# Patient Record
Sex: MALE | Race: WHITE
[De-identification: names, ages, dates, MRNs, and addresses within clinical notes are randomized per-mention and may not be internally consistent; named-entity substitution may affect disease eponyms.]

---

## 2018-02-26 ENCOUNTER — HOSPITAL ENCOUNTER (EMERGENCY)
Dept: HOSPITAL 80 - FED | Age: 38
Discharge: HOME | End: 2018-02-26
Payer: MEDICAID

## 2018-02-26 VITALS
DIASTOLIC BLOOD PRESSURE: 84 MMHG | TEMPERATURE: 98.6 F | OXYGEN SATURATION: 97 % | HEART RATE: 81 BPM | SYSTOLIC BLOOD PRESSURE: 122 MMHG

## 2018-02-26 VITALS — RESPIRATION RATE: 16 BRPM

## 2018-02-26 DIAGNOSIS — F17.200: ICD-10-CM

## 2018-02-26 DIAGNOSIS — J40: Primary | ICD-10-CM

## 2018-02-26 LAB — PLATELET # BLD: 107 10^3/UL (ref 150–400)

## 2018-02-26 NOTE — EDPHY
H & P


Stated Complaint: Fever, sob, confusion, dizziness, chest pain for 5 days.





- Personal History


Current Tetanus Diphtheria and Acellular Pertussis (TDAP): Yes





- Medical/Surgical History


Hx Asthma: No


Hx Chronic Respiratory Disease: No


Hx Diabetes: No


Hx Cardiac Disease: No


Hx Renal Disease: No


Hx Cirrhosis: No


Hx Alcoholism: No


Hx HIV/AIDS: No


Hx Splenectomy or Spleen Trauma: No


Other PMH: ANXIETY FACIAL RECONSTRUCTION R BBB





- Social History


Smoking Status: Current every day smoker


Time Seen by Provider: 02/26/18 13:50


HPI/ROS: 





CHIEF COMPLAINT: "I think I have the flu"





HISTORY OF PRESENT ILLNESS:  38-year-old immunocompetent male with no influenza 

vaccination this season complaining of 6 days of flu-like symptoms including 

high dyspnea, cough, chest pain.  No fluids vaccination.  No abdominal pain.  

No nausea or vomiting.  No diarrhea





PRIMARY CARE PROVIDER:  Benito cooper





REVIEW OF SYSTEMS:


A ten point review of systems was performed and is negative with the exception 

of the items mentioned in the HPI








PAST MEDICAL & SURGICAL  HISTORY:  Anxiety





SOCIAL HISTORY:Half pack per day tobacco.     








************


PHYSICAL EXAM





(Prior to examination, patient consented to physical exam, hands were washed 

and my usual and customary physical exam procedures followed)


1) GENERAL: Well-developed, well-nourished, alert and oriented.  Appears 

nontoxic.


2) HEAD: Normocephalic, atraumatic


3) HEENT: Pupils equal, round, reactive to light bilaterally.  Sclera 

anicteric.  Nasopharynx, oropharynx, clear, no lesions.  No tonsillar 

enlargement or exudate Ears bilaterally with normal tympanic membranes.


4) NECK: Full range of motion, no meningeal signs.


5) LUNGS: Clear auscultation bilaterally, no wheezes, no rhonchi, no 

retractions.   


6) HEART: Regular rate and rhythm, no murmur, no heave, no gallop.


7) ABDOMEN: No guarding, no rebound, no focal tenderness, negative McBurney's, 

negative Angulo's, negative Rovsing's, negative peritoneal sign,


8) MUSCULOSKELETAL: Moving all extremities, no focal areas of tenderness, no 

obvious trauma.  No peripheral edema or discoloration.


9) BACK: No CVA tenderness, no midline vertebral tenderness, no fluctuance, no 

step-off, no obvious trauma, no visual or palpable abnormality. 


10) SKIN: No rash, no petechiae. 


11) Psychiatric:  Patient is oriented X 3, there is no agitation.








***************





DIFFERENTIAL DIAGNOSIS:  In no particular order, including but not limited to 

myocardial ischemia, pulmonary embolus, chest wall pain, pleural inflammation 

and pulmonary infectious causes.


 (GLORIA Martell)


Constitutional: 


 Initial Vital Signs











Temperature (C)  37 C   02/26/18 12:55


 


Heart Rate  88   02/26/18 12:55


 


Respiratory Rate  16   02/26/18 12:55


 


Blood Pressure  115/69   02/26/18 12:55


 


O2 Sat (%)  96   02/26/18 12:55








 











O2 Delivery Mode               Room Air














Allergies/Adverse Reactions: 


 





No Known Allergies Allergy (Unverified 02/27/14 17:34)


 








Home Medications: 














 Medication  Instructions  Recorded


 


Albuterol [Proventil Inhaler HFA 1 - 2 puffs IH Q4PRN PRN #1 mdi 02/26/18





(*)]  


 


Benzonatate [Tessalon Pearles (RX)] 200 mg PO TID PRN #15 cap 02/26/18


 


Clonazepam  02/26/18


 


Ipratropium 0.06% Nasal [Atrovent 2 sprays EACHNARE QID #1 mdi 02/26/18





0.06% Nasal (RX)]  


 


Wellbutrin 100mg (*)  02/26/18














Medical Decision Making


ED Course/Re-evaluation: 





I did not see this patient while he was in the emergency department.  However 

his care was discussed with the PA while the patient was in the department.  I 

agree with treatment plan and management (Heraclio Palomo)





4:13 p.m.:  Re-evaluation.  He is feeling improvement.  He appears well.  

Discussed his diagnostic studies which include normal sinus rhythm EKG, 

negative troponin, negative D-dimer, chest x-ray showing no infiltrate.  I 

think that negative D-dimer adequately excludes pulmonary embolus in this 

patient whom I have a low to medium risk suspicion for pulmonary embolus.  

Doubt MI in the presence of symptoms for the past several days.  No infiltrate 

on chest x-ray.  We discussed more than likely viral etiology.  We discussed 

possible influenza etiology.  Symptoms have been occurring for several days 

now.  We discussed supportive care and discharge home.  At this time I do not 

think there is definitive indication for hospital admission; he is maintaining 

normal saturations, breathing comfortably, lungs are clear bilaterally, no 

immunosuppressed comorbidities.  Discussed his laboratory results.  I 

recommended recheck laboratory studies with his primary care physician at 

[facility].  Plan will be discharge home with medication for supportive care 

of his URI symptoms.  Recommend smoking cessation.  Definitely if he develops 

new or worsening symptoms to return to the ER immediately.  He and his partner 

feel comfortable being discharged.  Care of patient under supervision of 

secondary supervising physician Dr Heraclio Palomo.  (GLORIA Martell)





- Data Points


Laboratory Results: 


 Laboratory Results





 02/26/18 13:55 





 02/26/18 13:55 











Departure





- Departure


Disposition: Home, Routine, Self-Care


Clinical Impression: 


 Bronchitis





Condition: Good


Instructions:  Acute Bronchitis (ED)


Additional Instructions: 


Return to the emergency department immediately for change in breathing habits, 

change in voice, change in swallowing habits, change in mental status, or any 

other symptoms that concern you.


Referrals: 


Tamar Loja PA [Primary Care Provider] - 2-3 days, call for appt.


Prescriptions: 


Albuterol [Proventil Inhaler HFA (*)] 1 - 2 puffs IH Q4PRN PRN #1 mdi


 PRN Reason: Cough, Moderate


Benzonatate [Tessalon Pearles (RX)] 200 mg PO TID PRN #15 cap


 PRN Reason: Cough, Moderate


Ipratropium 0.06% Nasal [Atrovent 0.06% Nasal (RX)] 2 sprays EACHNARE QID #1 mdi

## 2018-02-26 NOTE — CPEKG
Heart Rate: 78

RR Interval: 769

P-R Interval: 124

QRSD Interval: 110

QT Interval: 388

QTC Interval: 442

P Axis: 74

QRS Axis: 49

T Wave Axis: 33

EKG Severity - ABNORMAL ECG -

EKG Impression: SINUS RHYTHM

EKG Impression: NONSPECIFIC INTRAVENTRICULAR CONDUCTION DELAY

Electronically Signed By: Bhupendra Acosta 28-Feb-2018 08:02:31